# Patient Record
Sex: MALE | Race: WHITE | NOT HISPANIC OR LATINO | URBAN - NONMETROPOLITAN AREA
[De-identification: names, ages, dates, MRNs, and addresses within clinical notes are randomized per-mention and may not be internally consistent; named-entity substitution may affect disease eponyms.]

---

## 2024-08-06 ENCOUNTER — HOSPITAL ENCOUNTER (EMERGENCY)
Facility: HOSPITAL | Age: 24
Discharge: HOME | End: 2024-08-06
Attending: EMERGENCY MEDICINE
Payer: COMMERCIAL

## 2024-08-06 VITALS
SYSTOLIC BLOOD PRESSURE: 132 MMHG | WEIGHT: 137 LBS | DIASTOLIC BLOOD PRESSURE: 84 MMHG | OXYGEN SATURATION: 100 % | BODY MASS INDEX: 19.61 KG/M2 | RESPIRATION RATE: 15 BRPM | HEIGHT: 70 IN | TEMPERATURE: 98.1 F | HEART RATE: 63 BPM

## 2024-08-06 DIAGNOSIS — H60.332 ACUTE SWIMMER'S EAR OF LEFT SIDE: Primary | ICD-10-CM

## 2024-08-06 PROCEDURE — 99283 EMERGENCY DEPT VISIT LOW MDM: CPT

## 2024-08-06 RX ORDER — CIPROFLOXACIN AND DEXAMETHASONE 3; 1 MG/ML; MG/ML
4 SUSPENSION/ DROPS AURICULAR (OTIC) 2 TIMES DAILY
Qty: 7.5 ML | Refills: 0 | Status: SHIPPED | OUTPATIENT
Start: 2024-08-06 | End: 2024-08-13

## 2024-08-06 ASSESSMENT — PAIN DESCRIPTION - LOCATION: LOCATION: EAR

## 2024-08-06 ASSESSMENT — PAIN DESCRIPTION - PROGRESSION: CLINICAL_PROGRESSION: GRADUALLY WORSENING

## 2024-08-06 ASSESSMENT — COLUMBIA-SUICIDE SEVERITY RATING SCALE - C-SSRS
1. IN THE PAST MONTH, HAVE YOU WISHED YOU WERE DEAD OR WISHED YOU COULD GO TO SLEEP AND NOT WAKE UP?: NO
6. HAVE YOU EVER DONE ANYTHING, STARTED TO DO ANYTHING, OR PREPARED TO DO ANYTHING TO END YOUR LIFE?: NO
2. HAVE YOU ACTUALLY HAD ANY THOUGHTS OF KILLING YOURSELF?: NO

## 2024-08-06 ASSESSMENT — PAIN - FUNCTIONAL ASSESSMENT: PAIN_FUNCTIONAL_ASSESSMENT: 0-10

## 2024-08-06 ASSESSMENT — PAIN DESCRIPTION - ORIENTATION: ORIENTATION: LEFT

## 2024-08-06 ASSESSMENT — PAIN SCALES - GENERAL: PAINLEVEL_OUTOF10: 4

## 2024-08-06 NOTE — DISCHARGE INSTRUCTIONS
CiproDex 2 drops left ear 2 times per day for 7 days.    Follow-up with your primary care doctor in 1 week to recheck the ear.    Avoid swimming or getting water in the ear.  Do not use Q-tips in this ear.    Return for worsening symptoms or concerns

## 2024-08-06 NOTE — ED PROVIDER NOTES
[facility]   ED  Provider Note  8/6/2024 12:33 PM  AC10/AC10      Chief Complaint   Patient presents with    Earache     Left ear pain w/ lump x 5 days. Hx skin MRSA        History of Present Illness:   Donald Gooden is a 24 y.o. male presenting to the ED for left ear pain, beginning 2 days ago.  The complaint has been persistent, moderate in severity, and worsened by nothing.  Patient has pain and decreased hearing for the left ear.  He has some mild external swelling.  He uses Q-tips.  He denies any foreign body in the ear canal.  He denies any fever chills runny nose or cough recently.      Review of Systems:   Pertinent positives and review of systems as noted above.  Remaining 10 review of systems is negative or noncontributory to today's episode of care.  Review of Systems       --------------------------------------------- PAST HISTORY ---------------------------------------------  Past Medical History:   Past Medical History:   Diagnosis Date    MRSA cellulitis         Past Surgical History: History reviewed. No pertinent surgical history.     Social History:   Social History     Social History Narrative    Not on file        Family History: family history is not on file. Unless otherwise noted, family history is non contributory    Patient's Medications   New Prescriptions    CIPROFLOXACIN-DEXAMETHASONE (CIPRODEX) OTIC SUSPENSION    Administer 4 drops into the left ear 2 times a day for 7 days.   Previous Medications    No medications on file   Modified Medications    No medications on file   Discontinued Medications    No medications on file      The patient’s home medications have been reviewed.    Allergies: Patient has no known allergies.    -------------------------------------------------- RESULTS -------------------------------------------------  All laboratory and radiology results have been personally reviewed by myself   LABS:  Labs Reviewed - No data to display      RADIOLOGY:  Interpreted by  "Radiologist.  No orders to display       No results found for this or any previous visit (from the past 4464 hour(s)).  ------------------------- NURSING NOTES AND VITALS REVIEWED ---------------------------   The nursing notes within the ED encounter and vital signs as below have been reviewed.   /84   Pulse 63   Temp 36.7 °C (98.1 °F) (Temporal)   Resp 15   Ht 1.778 m (5' 10\")   Wt 62.1 kg (137 lb)   SpO2 100%   BMI 19.66 kg/m²   Oxygen Saturation Interpretation: Normal      ---------------------------------------------------PHYSICAL EXAM--------------------------------------  Physical Exam   Constitutional/General: Alert,  well appearing, non toxic in NAD  Head: Normocephalic and atraumatic  Eyes: PERRL, EOMI, conjunctiva normal, sclera non icteric  Mouth: Oropharynx clear, handling secretions, no trismus, no asymmetry of the posterior oropharynx or uvular edema  Ear: Left ear shows mild external swelling with swelling of the ear canal and erythema.  The drum was not well-seen.  Right ear and canal are normal with a normal eardrum.  Neck: Supple, full ROM, non tender to palpation in the midline, no stridor, no crepitus, no meningeal signs  Respiratory: Lungs clear to auscultation bilaterally, no wheezes, rales, or rhonchi. Not in respiratory distress  Cardiovascular:  Regular rate. Regular rhythm. No murmurs, gallops, or rubs. 2+ distal pulses  Chest: No chest wall tenderness  GI:  Abdomen Soft, Non tender, Non distended.  +BS. No organomegaly, no palpable masses,  No rebound, guarding, or rigidity.   Musculoskeletal: Moves all extremities x 4. Warm and well perfused, no clubbing, cyanosis, or edema. Capillary refill <3 seconds  Integument: skin warm and dry. No rashes.   Lymphatic: no lymphadenopathy noted  Neurologic: No focal deficits, symmetric strength 5/5 in the upper and lower extremities bilaterally  Psychiatric: Normal Affect    Procedures    ------------------------------ ED COURSE/MEDICAL " DECISION MAKING----------------------  Diagnoses as of 08/06/24 1311   Acute swimmer's ear of left side      Patient has a left otitis media.  He was treated with Ciprodex otic drops.  He has follow-up with his doctor 1 week for recheck of cure.  He is return for worsening symptoms or concerns.  I advised him not to use Q-tips in his ears.      Medical Decision Making:   Discharged to home  Diagnoses as of 08/06/24 1311   Acute swimmer's ear of left side      Counseling:   The emergency provider has spoken with the patient and discussed today’s results, in addition to providing specific details for the plan of care and counseling regarding the diagnosis and prognosis.  Questions are answered at this time and they are agreeable with the plan.      --------------------------------- IMPRESSION AND DISPOSITION ---------------------------------        IMPRESSION  1. Acute swimmer's ear of left side        DISPOSITION  Disposition: Discharge to home  Patient condition is fair      Billing Provider Critical Care Time: 0 minutes     Augustine Price MD  08/06/24 2894